# Patient Record
Sex: FEMALE | Race: BLACK OR AFRICAN AMERICAN | NOT HISPANIC OR LATINO | URBAN - METROPOLITAN AREA
[De-identification: names, ages, dates, MRNs, and addresses within clinical notes are randomized per-mention and may not be internally consistent; named-entity substitution may affect disease eponyms.]

---

## 2019-09-11 ENCOUNTER — EMERGENCY (EMERGENCY)
Facility: HOSPITAL | Age: 29
LOS: 1 days | Discharge: HOME | End: 2019-09-11
Attending: EMERGENCY MEDICINE | Admitting: EMERGENCY MEDICINE
Payer: MEDICARE

## 2019-09-11 VITALS
DIASTOLIC BLOOD PRESSURE: 75 MMHG | OXYGEN SATURATION: 100 % | HEART RATE: 90 BPM | TEMPERATURE: 98 F | SYSTOLIC BLOOD PRESSURE: 116 MMHG | RESPIRATION RATE: 18 BRPM

## 2019-09-11 PROCEDURE — 99283 EMERGENCY DEPT VISIT LOW MDM: CPT

## 2019-09-11 RX ORDER — BENZOCAINE AND MENTHOL 5; 1 G/100ML; G/100ML
1 LIQUID ORAL ONCE
Refills: 0 | Status: COMPLETED | OUTPATIENT
Start: 2019-09-11 | End: 2019-09-11

## 2019-09-11 RX ORDER — DEXAMETHASONE 0.5 MG/5ML
8 ELIXIR ORAL ONCE
Refills: 0 | Status: COMPLETED | OUTPATIENT
Start: 2019-09-11 | End: 2019-09-11

## 2019-09-11 RX ORDER — DEXAMETHASONE 0.5 MG/5ML
4 ELIXIR ORAL ONCE
Refills: 0 | Status: DISCONTINUED | OUTPATIENT
Start: 2019-09-11 | End: 2019-09-11

## 2019-09-11 RX ORDER — ACETAMINOPHEN 500 MG
650 TABLET ORAL ONCE
Refills: 0 | Status: COMPLETED | OUTPATIENT
Start: 2019-09-11 | End: 2019-09-11

## 2019-09-11 RX ADMIN — Medication 650 MILLIGRAM(S): at 13:47

## 2019-09-11 RX ADMIN — BENZOCAINE AND MENTHOL 1 LOZENGE: 5; 1 LIQUID ORAL at 13:47

## 2019-09-11 RX ADMIN — Medication 8 MILLIGRAM(S): at 13:47

## 2019-09-11 NOTE — ED PROVIDER NOTE - ATTENDING CONTRIBUTION TO CARE
agree with resident note  "39 y/o F w/ no PMH from [facility] group home p/w x1 day of sore throat and HA. Patient denies rhinorrhea, cough, SOB, stridor, chest pain or food impaction. Per aide, patient has no hx of foreign body ingestion. No sick contacts in group home. No urinary symptoms or abd pain."    PE: well appearing; afebrile; no stridor; no tonsillar exudates; slightly erythematous posterior pharynx; no uvular deviation; CTAB/L; s1 s2 no m/r/g abd soft/NT/ND    Imp: viral pharyngitis; steroids, NSAIDs, lozenges, reassess

## 2019-09-11 NOTE — ED ADULT TRIAGE NOTE - CHIEF COMPLAINT QUOTE
Pt from [facility] adult home arrives with co sore throat and headache today. pt is afebrile

## 2019-09-11 NOTE — ED PROVIDER NOTE - NSFOLLOWUPINSTRUCTIONS_ED_ALL_ED_FT
You were seen in the Emergency Department for XYZ.   1) Advance activity as tolerated.    2) Continue all previously prescribed medications as directed.    3) Follow up with your primary care physician in 24-48 hours - take copies of your results.    4) Return to the Emergency Department for worsening or persistent symptoms, and/or ANY NEW OR CONCERNING SYMPTOMS including but not limited to worsening shortness of breath, difficulty speaking in full sentences, severe chest pain or abdominal pain.

## 2019-09-11 NOTE — ED PROVIDER NOTE - PATIENT PORTAL LINK FT
You can access the FollowMyHealth Patient Portal offered by [facility] by registering at the following website: [email or web address]. By joining [employer]’s FollowMyHealth portal, you will also be able to view your health information using other applications (apps) compatible with our system.

## 2019-09-11 NOTE — ED PROVIDER NOTE - CLINICAL SUMMARY MEDICAL DECISION MAKING FREE TEXT BOX
39 y/o F w/ likely viral pharyngitis. Clinically well-appearing, non-toxic. Dexamethasone IM, cepacol and tylenol given, reassess. Likely discharge.

## 2019-09-11 NOTE — ED PROVIDER NOTE - PHYSICAL EXAMINATION
[Const] well-appearing, resting comfortably, no acute distress  [HEENT] PERRL, EOM, MMM, no tonsillar exudate, cobblestoning or erythema  [Neck] Supple, trachea midline  [CV] +S1/S2, no m/r/g appreciated  [Lungs] CTABL, no adventitious lung sounds  [Abd] soft, non-tender, nondistended in all 4 quadrants  [MSK] 5/5 UE and LR str BL  [Skin] warm, dry, well-perfused  [Neuro] A&Ox3, CN II-XII intact

## 2019-09-11 NOTE — ED PROVIDER NOTE - OBJECTIVE STATEMENT
37 y/o F w/ no PMH from [facility] group home p/w x1 day of sore throat and HA. Patient denies rhinorrhea, cough, SOB, stridor, chest pain or food impaction. Per aide, patient has no hx of foreign body ingestion. No sick contacts in group home. No urinary symptoms or abd pain.

## 2019-09-16 PROBLEM — Z78.9 OTHER SPECIFIED HEALTH STATUS: Chronic | Status: ACTIVE | Noted: 2019-09-11

## 2019-09-26 ENCOUNTER — NON-APPOINTMENT (OUTPATIENT)
Age: 29
End: 2019-09-26

## 2019-09-26 ENCOUNTER — APPOINTMENT (OUTPATIENT)
Dept: OPHTHALMOLOGY | Facility: CLINIC | Age: 29
End: 2019-09-26
Payer: MEDICARE

## 2019-09-26 PROCEDURE — 92004 COMPRE OPH EXAM NEW PT 1/>: CPT

## 2022-04-25 ENCOUNTER — NON-APPOINTMENT (OUTPATIENT)
Age: 32
End: 2022-04-25

## 2022-04-25 ENCOUNTER — APPOINTMENT (OUTPATIENT)
Dept: OPHTHALMOLOGY | Facility: CLINIC | Age: 32
End: 2022-04-25
Payer: MEDICARE

## 2022-04-25 PROCEDURE — 92014 COMPRE OPH EXAM EST PT 1/>: CPT

## 2024-12-17 ENCOUNTER — EMERGENCY (EMERGENCY)
Facility: HOSPITAL | Age: 34
LOS: 1 days | Discharge: HOME | End: 2024-12-17
Attending: EMERGENCY MEDICINE | Admitting: EMERGENCY MEDICINE
Payer: MEDICARE

## 2024-12-17 VITALS
SYSTOLIC BLOOD PRESSURE: 135 MMHG | RESPIRATION RATE: 18 BRPM | TEMPERATURE: 98 F | OXYGEN SATURATION: 99 % | WEIGHT: 259.93 LBS | DIASTOLIC BLOOD PRESSURE: 90 MMHG | HEART RATE: 73 BPM

## 2024-12-17 DIAGNOSIS — T78.3XXA ANGIONEUROTIC EDEMA, INITIAL ENCOUNTER: ICD-10-CM

## 2024-12-17 LAB
ALBUMIN SERPL ELPH-MCNC: 3.9 G/DL — SIGNIFICANT CHANGE UP (ref 3.3–5)
ALP SERPL-CCNC: 56 U/L — SIGNIFICANT CHANGE UP (ref 40–120)
ALT FLD-CCNC: 17 U/L — SIGNIFICANT CHANGE UP (ref 4–33)
ANION GAP SERPL CALC-SCNC: 14 MMOL/L — SIGNIFICANT CHANGE UP (ref 7–14)
AST SERPL-CCNC: 19 U/L — SIGNIFICANT CHANGE UP (ref 4–32)
BASOPHILS # BLD AUTO: 0.02 K/UL — SIGNIFICANT CHANGE UP (ref 0–0.2)
BASOPHILS NFR BLD AUTO: 0.4 % — SIGNIFICANT CHANGE UP (ref 0–2)
BILIRUB SERPL-MCNC: 0.3 MG/DL — SIGNIFICANT CHANGE UP (ref 0.2–1.2)
BUN SERPL-MCNC: 8 MG/DL — SIGNIFICANT CHANGE UP (ref 7–23)
CALCIUM SERPL-MCNC: 9.1 MG/DL — SIGNIFICANT CHANGE UP (ref 8.4–10.5)
CHLORIDE SERPL-SCNC: 104 MMOL/L — SIGNIFICANT CHANGE UP (ref 98–107)
CO2 SERPL-SCNC: 20 MMOL/L — LOW (ref 22–31)
CREAT SERPL-MCNC: 0.55 MG/DL — SIGNIFICANT CHANGE UP (ref 0.5–1.3)
CRP SERPL-MCNC: 8.8 MG/L — HIGH
EGFR: 125 ML/MIN/1.73M2 — SIGNIFICANT CHANGE UP
EOSINOPHIL # BLD AUTO: 0.07 K/UL — SIGNIFICANT CHANGE UP (ref 0–0.5)
EOSINOPHIL NFR BLD AUTO: 1.5 % — SIGNIFICANT CHANGE UP (ref 0–6)
ERYTHROCYTE [SEDIMENTATION RATE] IN BLOOD: 18 MM/HR — SIGNIFICANT CHANGE UP (ref 4–25)
FLUAV AG NPH QL: SIGNIFICANT CHANGE UP
FLUBV AG NPH QL: SIGNIFICANT CHANGE UP
GAS PNL BLDV: SIGNIFICANT CHANGE UP
GLUCOSE SERPL-MCNC: 84 MG/DL — SIGNIFICANT CHANGE UP (ref 70–99)
HCT VFR BLD CALC: 38.1 % — SIGNIFICANT CHANGE UP (ref 34.5–45)
HGB BLD-MCNC: 13.5 G/DL — SIGNIFICANT CHANGE UP (ref 11.5–15.5)
IANC: 2.79 K/UL — SIGNIFICANT CHANGE UP (ref 1.8–7.4)
IMM GRANULOCYTES NFR BLD AUTO: 0.2 % — SIGNIFICANT CHANGE UP (ref 0–0.9)
LYMPHOCYTES # BLD AUTO: 1.43 K/UL — SIGNIFICANT CHANGE UP (ref 1–3.3)
LYMPHOCYTES # BLD AUTO: 29.8 % — SIGNIFICANT CHANGE UP (ref 13–44)
MAGNESIUM SERPL-MCNC: 2 MG/DL — SIGNIFICANT CHANGE UP (ref 1.6–2.6)
MCHC RBC-ENTMCNC: 29.4 PG — SIGNIFICANT CHANGE UP (ref 27–34)
MCHC RBC-ENTMCNC: 35.4 G/DL — SIGNIFICANT CHANGE UP (ref 32–36)
MCV RBC AUTO: 83 FL — SIGNIFICANT CHANGE UP (ref 80–100)
MONOCYTES # BLD AUTO: 0.48 K/UL — SIGNIFICANT CHANGE UP (ref 0–0.9)
MONOCYTES NFR BLD AUTO: 10 % — SIGNIFICANT CHANGE UP (ref 2–14)
NEUTROPHILS # BLD AUTO: 2.79 K/UL — SIGNIFICANT CHANGE UP (ref 1.8–7.4)
NEUTROPHILS NFR BLD AUTO: 58.1 % — SIGNIFICANT CHANGE UP (ref 43–77)
NRBC # BLD: 0 /100 WBCS — SIGNIFICANT CHANGE UP (ref 0–0)
NRBC # FLD: 0 K/UL — SIGNIFICANT CHANGE UP (ref 0–0)
PHOSPHATE SERPL-MCNC: 3 MG/DL — SIGNIFICANT CHANGE UP (ref 2.5–4.5)
PLATELET # BLD AUTO: 238 K/UL — SIGNIFICANT CHANGE UP (ref 150–400)
POTASSIUM SERPL-MCNC: 4.2 MMOL/L — SIGNIFICANT CHANGE UP (ref 3.5–5.3)
POTASSIUM SERPL-SCNC: 4.2 MMOL/L — SIGNIFICANT CHANGE UP (ref 3.5–5.3)
PROT SERPL-MCNC: 7.4 G/DL — SIGNIFICANT CHANGE UP (ref 6–8.3)
RBC # BLD: 4.59 M/UL — SIGNIFICANT CHANGE UP (ref 3.8–5.2)
RBC # FLD: 12.5 % — SIGNIFICANT CHANGE UP (ref 10.3–14.5)
RSV RNA NPH QL NAA+NON-PROBE: SIGNIFICANT CHANGE UP
SARS-COV-2 RNA SPEC QL NAA+PROBE: SIGNIFICANT CHANGE UP
SODIUM SERPL-SCNC: 138 MMOL/L — SIGNIFICANT CHANGE UP (ref 135–145)
WBC # BLD: 4.8 K/UL — SIGNIFICANT CHANGE UP (ref 3.8–10.5)
WBC # FLD AUTO: 4.8 K/UL — SIGNIFICANT CHANGE UP (ref 3.8–10.5)

## 2024-12-17 PROCEDURE — 99223 1ST HOSP IP/OBS HIGH 75: CPT | Mod: FS

## 2024-12-17 PROCEDURE — 70491 CT SOFT TISSUE NECK W/DYE: CPT | Mod: 26,MC

## 2024-12-17 RX ORDER — METHYLPREDNISOLONE SOD SUCC 125 MG
125 VIAL (EA) INJECTION ONCE
Refills: 0 | Status: COMPLETED | OUTPATIENT
Start: 2024-12-17 | End: 2024-12-17

## 2024-12-17 RX ORDER — FAMOTIDINE 20 MG/1
20 TABLET, FILM COATED ORAL EVERY 12 HOURS
Refills: 0 | Status: ACTIVE | OUTPATIENT
Start: 2024-12-17 | End: 2025-11-15

## 2024-12-17 RX ORDER — METHYLPREDNISOLONE SOD SUCC 125 MG
40 VIAL (EA) INJECTION EVERY 8 HOURS
Refills: 0 | Status: DISCONTINUED | OUTPATIENT
Start: 2024-12-17 | End: 2024-12-17

## 2024-12-17 RX ORDER — VALACYCLOVIR HYDROCHLORIDE 1 G/1
1000 TABLET, FILM COATED ORAL ONCE
Refills: 0 | Status: COMPLETED | OUTPATIENT
Start: 2024-12-17 | End: 2024-12-17

## 2024-12-17 RX ORDER — METHYLPREDNISOLONE SOD SUCC 125 MG
40 VIAL (EA) INJECTION EVERY 8 HOURS
Refills: 0 | Status: ACTIVE | OUTPATIENT
Start: 2024-12-17 | End: 2025-11-15

## 2024-12-17 RX ORDER — DIPHENHYDRAMINE HCL 25 MG
25 CAPSULE ORAL EVERY 6 HOURS
Refills: 0 | Status: ACTIVE | OUTPATIENT
Start: 2024-12-17 | End: 2025-11-15

## 2024-12-17 RX ORDER — FAMOTIDINE 20 MG/1
20 TABLET, FILM COATED ORAL DAILY
Refills: 0 | Status: ACTIVE | OUTPATIENT
Start: 2024-12-17 | End: 2025-11-15

## 2024-12-17 RX ORDER — DIPHENHYDRAMINE HCL 25 MG
50 CAPSULE ORAL ONCE
Refills: 0 | Status: COMPLETED | OUTPATIENT
Start: 2024-12-17 | End: 2024-12-17

## 2024-12-17 RX ADMIN — Medication 25 MILLIGRAM(S): at 18:15

## 2024-12-17 RX ADMIN — VALACYCLOVIR HYDROCHLORIDE 1000 MILLIGRAM(S): 1 TABLET, FILM COATED ORAL at 13:53

## 2024-12-17 RX ADMIN — FAMOTIDINE 20 MILLIGRAM(S): 20 TABLET, FILM COATED ORAL at 22:24

## 2024-12-17 RX ADMIN — Medication 50 MILLIGRAM(S): at 10:07

## 2024-12-17 RX ADMIN — Medication 40 MILLIGRAM(S): at 18:14

## 2024-12-17 RX ADMIN — FAMOTIDINE 100 MILLIGRAM(S): 20 TABLET, FILM COATED ORAL at 10:07

## 2024-12-17 RX ADMIN — Medication 125 MILLIGRAM(S): at 10:06

## 2024-12-17 NOTE — ED ADULT NURSE NOTE - NSFALLUNIVINTERV_ED_ALL_ED
Bed/Stretcher in lowest position, wheels locked, appropriate side rails in place/Call bell, personal items and telephone in reach/Instruct patient to call for assistance before getting out of bed/chair/stretcher/Non-slip footwear applied when patient is off stretcher/[place] to call system/Physically safe environment - no spills, clutter or unnecessary equipment/Purposeful proactive rounding/Room/bathroom lighting operational, light cord in reach

## 2024-12-17 NOTE — ED CDU PROVIDER INITIAL DAY NOTE - PHYSICAL EXAMINATION
CONSTITUTIONAL: Well-appearing; well-nourished; in no apparent distress;  HEAD: Normocephalic, atraumatic;  EYES: PERRL, EOM intact, conjunctiva and sclera WNL;  ENT: normal nose; no rhinorrhea; + lower lip edema, + R small vesicular lesion just outside of vermillion border on R corner of mouth. tolerating secretions, no trismus.  NECK/LYMPH: Supple; non-tender;  CARD: Normal S1, S2; no murmurs, rubs, or gallops noted  RESP: Normal chest excursion with respiration; breath sounds clear and equal bilaterally; no wheezes, rhonchi, or rales noted  EXT/MS: moves all extremities  SKIN: Normal for age and race  NEURO: Awake, alert, oriented x 3, no gross deficits  PSYCH: Normal mood; appropriate affect

## 2024-12-17 NOTE — ED PROVIDER NOTE - OBJECTIVE STATEMENT
35 y/o F with PMHx of intellectual disability presenting today with lip and facial swelling since this morning. Pt woke up this morning with swelling to her lower lip and right side of her lower face. Pt's voice appears muffled however pt's aid states her voice normally sounds like this at baseline. Limited ability to open pt's mouth. Pt denies fever, chills, SOB, throat pain, recent dental procedures, ear pain, neck pain, rash, abdominal pain, nausea, vomiting, diarrhea, known allergens, recent trial of new foods or hygiene products. Pt takes lisinopril, dose recently changed about a month ago.

## 2024-12-17 NOTE — ED ADULT TRIAGE NOTE - CHIEF COMPLAINT QUOTE
pt brought in by EMS from group home for lip swelling. as per staff pt went to bed and woke up with lip swelling. pt denies SOB. pt denies any allergies.

## 2024-12-17 NOTE — CHART NOTE - NSCHARTNOTEFT_GEN_A_CORE
Orthopedics consulted for incidental finding of posterior longitudinal ligament ossification C2-C3 seen on CT Neck soft tissue. Team has no concerns for neuro deficits or complaints related to this finding. No ortho intervention indicated at this time. Patient may establish care with spine surgery if symptoms arise in the future.     For all questions related to patient care, please reach out via the on-call pager.*     Maria Del Carmen Pham PGY2  Orthopedic Surgery  [facility]: [id number]  [facility]: [id number]  [facility]: [id number]

## 2024-12-17 NOTE — ED CDU PROVIDER INITIAL DAY NOTE - OBJECTIVE STATEMENT
34yoF PMH intellectual disability from group home with aid at bedside Magaly providing history, HTN on lisinopril, last dose this AM and recently increaesd the dose with her PCP, p/w lower lip swelling and R sided facial swelling this AM. no difficulty breathing, changes in speech, difficulty swallowing, n/v/d/c, fevers. ENT was consulted and pt unable to tolerate scope, therefore CT neck was ordered revealing " Nasopharyngeal swelling, nonspecific, without focal lesion 2. Mildly enlarged level 1 lymph nodes, nonspecific 3. Heterogeneous parotid glands including small nodules ; consider ultrasound evaluation 4. Heterotopic ossification the posterior longitudinal ligament causes deformity of the ventral cervical cord at the C2-C3 level". Neurosurgery was consulted, no urgent recommendations at this time other than outpatient f/u. labs wnl. pt given steroids, benadryl, and will continue iv steroids/benadryl in CDU and ENT reassessment.

## 2024-12-17 NOTE — ED PROVIDER NOTE - PHYSICAL EXAMINATION
GENERAL: no acute distress, non-toxic appearing  HEENT: normal conjunctiva, oral mucosa moist, soft floor of the mouth and tongue, unable to visualize posterior oropharynx 2/2 to pt cooperation. no palpable mass or abscess, no palpable lymphadenopathy, muffled voice, trismus. edema to the right side of the lower lip and right lower jaw  CARDIAC: regular rate and regular rhythm, normal S1 and S2, no appreciable murmurs  PULM: clear to ascultation bilaterally, no appreciable crackles, rales, rhonchi, or wheezing, no increased work of breathing  GI: abdomen nondistended, soft, nontender  SKIN: no visible rashes

## 2024-12-17 NOTE — ED CDU PROVIDER INITIAL DAY NOTE - ATTENDING APP SHARED VISIT CONTRIBUTION OF CARE
Dr. Main: 33 yo female with intellectual disability, living in a group facility, and HTN on lisinopril (last dose this morning), in ED with swelling to lower lip and right face noted this morning.  Pt went to sleep feeling well, awoke noticing these changes.  AT BASELINE pt has a garbled/slurred voice as per supriya Mckenzie at bedside, and pt's speech is AT BASELINE in ED.  Pt states that she does not feel like she is having difficulty opening mouth, but appears to possibly have trismus.  No difficult swallowing or breathing.  No CP/SOB, N/V/D or abdominal pain.  No fever.  No recent new meds, soaps, detergents, foods.  Pt has been on lisinopril for years but somewhat recently had increase in dose.  On exam pt overall well appearing, in NAD, handling secretions easily.  Face shows mild generalized swelling but R>L, and there are ?vesicles at right corner of lips, but not open.  Oral cavity difficult to examine due to pt not opening fully; based on what is visualized, teeth intact, uvula midline, tongue midline without elevation and there is no sublingual swelling.  Unable to visualize tonsils.  Speech normal (at baseline).  ENT unable to scope pt due to difficulty with pt tolerating, so placed in CDU for continued treatment for possible allergic reaction vs. angioedema, re-eval, and ortho/spine consult for incidental finding at C2-C3 on CT.  Plan to discharge with Valtrex if cleared following observation period.

## 2024-12-17 NOTE — ED ADULT NURSE NOTE - OBJECTIVE STATEMENT
Patient brought to intake room 12, ambulatory, A&Ox4, speech gargled at baseline, but can be encouraged to speak, english speaking accompanied by aide, coming to the ed for complaints of possible allergic reaction, patient appears with swollen lip, no trauma cause, no po food intake since last known norm of 9pm last night, only recent change is increase in lisinopril dose, airway is patent no redness or irritation in mouth, respirations are even, and unlabored, patient denies any chest tightness, throat swelling, difficulty breathing, headache, dizziness, blurry vision, abdominal pain, no gu/gi complaints, No N/V/D, skin warm, dry, intact, IV 20g placed to L hand, care plan continued, comfort measures provided, safety maintained, awaiting medication order, and imaging,

## 2024-12-17 NOTE — ED PROVIDER NOTE - ATTENDING CONTRIBUTION TO CARE
Dr. Main: 35 yo female with intellectual disability, living in a group facility, and HTN, in ED with swelling to lower lip and right face noted this morning.  Pt went to sleep feeling well, awoke noticing these changes.  AT BASELINE pt has a garbled/slurred voice as per supriya Mckenzie at bedside, and pt's speech is AT BASELINE in ED.  Pt states that she does not feel like she is having difficulty opening mouth, but appears to possibly have trismus.  No difficult swallowing or breathing.  No CP/SOB, N/V/D or abdominal pain.  No fever.  No recent new meds, soaps, detergents, foods.  Pt has been on lisinopril for years but somewhat recently had increase in dose.  On exam pt overall well appearing, in NAD, handling secretions easily.  Face shows mild generalized swelling but R>L, and there are ?vesicles at right corner of lips, but not open.  Oral cavity difficult to examine due to pt not opening fully; based on what is visualized, teeth intact, uvula midline, tongue midline without elevation and there is no sublingual swelling.  Unable to visualize tonsils.  Speech normal (at baseline). Dr. Main: 33 yo female with intellectual disability, living in a group facility, and HTN on lisinopril (last dose this morning), in ED with swelling to lower lip and right face noted this morning.  Pt went to sleep feeling well, awoke noticing these changes.  AT BASELINE pt has a garbled/slurred voice as per supriya Mckenzie at bedside, and pt's speech is AT BASELINE in ED.  Pt states that she does not feel like she is having difficulty opening mouth, but appears to possibly have trismus.  No difficult swallowing or breathing.  No CP/SOB, N/V/D or abdominal pain.  No fever.  No recent new meds, soaps, detergents, foods.  Pt has been on lisinopril for years but somewhat recently had increase in dose.  On exam pt overall well appearing, in NAD, handling secretions easily.  Face shows mild generalized swelling but R>L, and there are ?vesicles at right corner of lips, but not open.  Oral cavity difficult to examine due to pt not opening fully; based on what is visualized, teeth intact, uvula midline, tongue midline without elevation and there is no sublingual swelling.  Unable to visualize tonsils.  Speech normal (at baseline).

## 2024-12-17 NOTE — CONSULT NOTE ADULT - PROBLEM SELECTOR RECOMMENDATION 9
1. decadron 10 mg x 1   2. pepcid x 1 iv  3. observe in CDU until this kevon, will discuss w Dr. Llanos and update dc plan 1. decadron 10 mg x 1   2. pepcid x 1 iv  3. observe in CDU until this kevon, if improving can discharge on a medrol dosepak   4. Follow up out patient ENT clinic for CT findings of parotid nodule, appt can be scheduled at   discussed plan with dr. Llanos

## 2024-12-17 NOTE — ED CDU PROVIDER INITIAL DAY NOTE - PROGRESS NOTE DETAILS
JESSIKA Gibbs: Pt received at sign out pending reassessment and monitoring, pt without complaints or SOB. Pt still with lower lip swelling, no stridor, no wheezes, lungs CTAB. (delayed entry) pt reassessed, with persistent lip swelling. some improvement of her R side. tolerating secretions and PO. spoke w/ ENT, recommending keep monitoring until significant improvement. will reassess in AM.

## 2024-12-17 NOTE — CONSULT NOTE ADULT - SUBJECTIVE AND OBJECTIVE BOX
CC: lip swelling     HPI: 34 year old female with HTN that presents to the ED with PCA stating she recently increased her dose of lisinopril. Patient was found to have R lip edema this morning that is now entire lower lip. Denies any SOB, hoarseness, dyspnea, cough or other complaints. Found to have left parotid nodule on CT, denies any facial pain, swelling or erythema.      PAST MEDICAL & SURGICAL HISTORY:  No pertinent past medical history      No significant past surgical history        Allergies    No Known Allergies    Intolerances      MEDICATIONS  (STANDING):  famotidine  IVPB 20 milliGRAM(s) IV Intermittent daily    MEDICATIONS  (PRN):      ROS:   ENT: all negative except as noted in HPI   CV: denies palpitations  Pulm: denies SOB, cough, hemoptysis  GI: denies change in apetite, indigestion, n/v  : denies pertinent urinary symptoms, urgency  Neuro: denies numbness/tingling, loss of sensation  Psych: denies anxiety  MS: denies muscle weakness, instability  Heme: denies easy bruising or bleeding  Endo: denies heat/cold intolerance, excessive sweating  Vascular: denies LE edema    Vital Signs Last 24 Hrs  T(C): 36.9 (17 Dec 2024 08:44), Max: 36.9 (17 Dec 2024 08:44)  T(F): 98.5 (17 Dec 2024 08:44), Max: 98.5 (17 Dec 2024 08:44)  HR: 73 (17 Dec 2024 08:44) (73 - 73)  BP: 135/90 (17 Dec 2024 08:44) (135/90 - 135/90)  BP(mean): --  RR: 18 (17 Dec 2024 08:44) (18 - 18)  SpO2: 99% (17 Dec 2024 08:44) (99% - 99%)    Parameters below as of 17 Dec 2024 08:44  Patient On (Oxygen Delivery Method): room air                              13.5   4.80  )-----------( 238      ( 17 Dec 2024 09:35 )             38.1    12-17    138  |  104  |  8   ----------------------------<  84  4.2   |  20[L]  |  0.55    Ca    9.1      17 Dec 2024 09:35  Phos  3.0     12-17  Mg     2.00     12-17    TPro  7.4  /  Alb  3.9  /  TBili  0.3  /  DBili  x   /  AST  19  /  ALT  17  /  AlkPhos  56  12-17       PHYSICAL EXAM:  Gen: NAD  Skin: No rashes, bruises, or lesions  Head: Normocephalic, Atraumatic  Face: no edema, erythema, or fluctuance. Parotid glands soft without mass  Eyes: no scleral injection  Ears: Right - ear canal clear, TM intact without effusion or erythema. No evidence of any fluid drainage. No mastoid tenderness, erythema, or ear bulging            Left - ear canal clear, TM intact without effusion or erythema. No evidence of any fluid drainage. No mastoid tenderness, erythema, or ear bulging  Nose: Nares bilaterally patent, no discharge  Mouth: No Stridor / Drooling / Trismus.  Mucosa moist, tongue/uvula midline, oropharynx clear  Neck: Flat, supple, no lymphadenopathy, trachea midline, no masses  Lymphatic: No lymphadenopathy  Resp: breathing easily, no stridor  CV: no peripheral edema/cyanosis  GI: nondistended   Peripheral vascular: no JVD or edema  Neuro: facial nerve intact, no facial droop      Diagnostic Nasal Endoscopy: patient refused scope exam and pulled scope out    Fiberoptic Indirect laryngoscopy: rufused scope exam     IMAGING/ADDITIONAL STUDIES:   IMPRESSION:    1. Nasopharyngeal swelling, nonspecific, without focal lesion    2. Mildly enlarged level 1 lymph nodes, nonspecific    3. Heterogeneous parotid glands including small nodules ; consider ultrasound evaluation    4. Heterotopic ossification the posterior longitudinal ligament causes deformity of the ventral cervical cord at the C2-C3 level    5. No significant abnormality of the temporal mandibular joints identified by the CT technique    6. Mild asymmetric soft tissue prominence along the buccal surface of the right hemimandible, without well-defined abscess

## 2024-12-17 NOTE — ED PROVIDER NOTE - PROGRESS NOTE DETAILS
Dr. Main: lower lip slightly more swollen than before, but still no airway issues and no difficult breathing/speaking/swallowing; ENT evaluated pt but pt not able to tolerate scope; ENT recommending prolonged observation to see if there is worsening Dr. Main: plan is for CDU for continued observation, start Valtrex for possible vesicular lesions near lip, follow up NSX eval for incidental findings on CT, and follow up ENT; discontinue lisinopril unless other more obvious cause of presentation found

## 2024-12-17 NOTE — ED CDU PROVIDER INITIAL DAY NOTE - CLINICAL SUMMARY MEDICAL DECISION MAKING FREE TEXT BOX
34yoF PMH intellectual disability from group home with aid at bedside Magaly providing history, HTN on lisinopril, last dose this AM and recently increaesd the dose with her PCP, p/w lower lip swelling and R sided facial swelling this AM.   suspect angioedema  ENT was consulted and pt unable to tolerate scope, therefore CT neck was ordered revealing " Nasopharyngeal swelling, nonspecific, without focal lesion 2. Mildly enlarged level 1 lymph nodes, nonspecific 3. Heterogeneous parotid glands including small nodules ; consider ultrasound evaluation 4. Heterotopic ossification the posterior longitudinal ligament causes deformity of the ventral cervical cord at the C2-C3 level". Neurosurgery was consulted, no urgent recommendations at this time other than outpatient f/u. labs wnl.     pt given steroids, benadryl, and will continue iv steroids/benadryl in CDU and ENT reassessment.

## 2024-12-17 NOTE — ED PROVIDER NOTE - CLINICAL SUMMARY MEDICAL DECISION MAKING FREE TEXT BOX
33 y/o F with PMHx of intellectual disability presenting today with lip and facial swelling since this morning. Pt woke up this morning with swelling to her lower lip and right side of her lower face. No known allergens, recent trial of new foods or hygiene products. Pt takes lisinopril, dose recently changed about a month ago. Trismus and edema to the right side of the lower lip and right lower jaw on exam. Unable to visualize posterior orophaynx however does not appear in acute respiratory distress at this time. Low concern for anaphylaxis at this time given lack of two body system involvement. Concern for allergic reaction 2/2 lisinopril vs infection. Will obtain CT images, basic labs and give steroids, benadryl and famotidine. ENT attempted to scope the pt but unsuccessful, recommending CDU admission for observation. Ortho consulted for incidental cervical findings on CT. Non actionable labs without signs of infection. HDS. Pt admitted to the CDU.

## 2024-12-18 VITALS
DIASTOLIC BLOOD PRESSURE: 83 MMHG | RESPIRATION RATE: 16 BRPM | HEART RATE: 87 BPM | TEMPERATURE: 98 F | SYSTOLIC BLOOD PRESSURE: 120 MMHG | OXYGEN SATURATION: 96 %

## 2024-12-18 PROCEDURE — 99239 HOSP IP/OBS DSCHRG MGMT >30: CPT

## 2024-12-18 RX ORDER — DIPHENHYDRAMINE HCL 25 MG
1 CAPSULE ORAL
Qty: 3 | Refills: 0
Start: 2024-12-18 | End: 2024-12-18

## 2024-12-18 RX ORDER — EPINEPHRINE 1 MG/ML(1)
0.3 AMPUL (ML) INJECTION
Qty: 1 | Refills: 0
Start: 2024-12-18 | End: 2024-12-18

## 2024-12-18 RX ORDER — LOSARTAN POTASSIUM 100 MG/1
1 TABLET, FILM COATED ORAL
Qty: 30 | Refills: 0
Start: 2024-12-18 | End: 2025-01-16

## 2024-12-18 RX ORDER — PREDNISONE 20 MG/1
1 TABLET ORAL
Qty: 4 | Refills: 0
Start: 2024-12-18 | End: 2024-12-21

## 2024-12-18 RX ORDER — VALACYCLOVIR HYDROCHLORIDE 1 G/1
1 TABLET, FILM COATED ORAL
Qty: 14 | Refills: 0
Start: 2024-12-18 | End: 2024-12-24

## 2024-12-18 RX ORDER — FAMOTIDINE 20 MG/1
1 TABLET, FILM COATED ORAL
Qty: 8 | Refills: 0
Start: 2024-12-18 | End: 2024-12-21

## 2024-12-18 RX ADMIN — FAMOTIDINE 20 MILLIGRAM(S): 20 TABLET, FILM COATED ORAL at 10:21

## 2024-12-18 RX ADMIN — Medication 25 MILLIGRAM(S): at 06:37

## 2024-12-18 RX ADMIN — Medication 40 MILLIGRAM(S): at 10:21

## 2024-12-18 RX ADMIN — Medication 25 MILLIGRAM(S): at 00:11

## 2024-12-18 RX ADMIN — Medication 40 MILLIGRAM(S): at 02:09

## 2024-12-18 NOTE — ED CDU PROVIDER DISPOSITION NOTE - NSFOLLOWUPINSTRUCTIONS_ED_ALL_ED_FT
Follow up with primary care provider Dr. Rapp within 24-48 hours. Take copy of results with you.  STOP Lisinopril, START Losartan 50mg daily.       Follow up with ENT for parotid gland findings on CT.    Follow up with Orthopedics for cervical changes on CT.     Take Prednisone 50mg daily for 4 days, Pepcid 20mg 2x/day for 4 days, Benadryl 25mg every 8 hours for 4 days- caution drowsiness/do not drive. Use Epi-pen only in case of emergency.       Worsening or new shortness of breath, tightness in your throat, swelling, chest pain, fever, chills, or ANY new concerning symptoms return to the Emergency Department.

## 2024-12-18 NOTE — ED CDU PROVIDER SUBSEQUENT DAY NOTE - ATTENDING APP SHARED VISIT CONTRIBUTION OF CARE
Patient is a 34-year-old female with a history of intellectual disability, hypertension, placed in CDU for monitoring of lower lip swelling and angioedema.  Patient presented with lower lip swelling and right-sided facial swelling after taking an increased dose of her lisinopril.  In the emergency department, ENT was consulted.  Patient did not tolerate a scope.  She did have a CT neck that showed: Nasopharyngeal swelling, nonspecific, without focal lesion 2. Mildly enlarged level 1 lymph nodes, nonspecific 3. Heterogeneous parotid glands including small nodules ; consider ultrasound evaluation 4. Heterotopic ossification the posterior longitudinal ligament causes deformity of the ventral cervical cord at the C2-C3 level". Neurosurgery was consulted, no urgent recommendations at this time other than outpatient f/u. labs wnl.   In the emergency department, patient was given steroids, benadryl.   Patient was placed in CDU for continued steroids/benadryl in CDU and ENT reassessment.  patient signed out this morning.  Accordingly, lip swelling has much improved.  Patient is tolerating p.o.  Patient was seen and examined this morning.      VS noted  Gen. no acute distress, Non toxic   HEENT: EOMI, mmm, lower lip swelling, airway intact, no stridor  Lungs: CTAB/L no C/ W /R   CVS: RRR   Abd; Soft non tender, non distended   Ext: no edema  Skin: no rash  Neuro:   Awake, alert, non focal, clear speech  a/p:  angioedema–lower lip swelling.  Per overnight PA assessment, lip swelling has improved.  On my exam there is still lip swelling present.  Patient was able to eat breakfast without any problems.  She denies any trouble breathing.  She is tolerating her secretions.  Plan for ENT reassessment and disposition.  - [place] DA SILVA

## 2024-12-18 NOTE — ED POST DISCHARGE NOTE - REASON FOR FOLLOW-UP
Other Home called about patient's rx 's not sent over. Pepcid and benadryl sent over. Also RX for Losartan sent by JESSIKA Barrientos

## 2024-12-18 NOTE — ED CDU PROVIDER SUBSEQUENT DAY NOTE - HISTORY
34yoF PMH intellectual disability from group home with aid at bedside Magaly providing history, HTN on lisinopril, last dose this AM and recently increased the dose with her PCP, p/w lower lip swelling and R sided facial swelling this AM.   suspect angioedema  ENT was consulted and pt unable to tolerate scope, therefore CT neck was ordered revealing " Nasopharyngeal swelling, nonspecific, without focal lesion 2. Mildly enlarged level 1 lymph nodes, nonspecific 3. Heterogeneous parotid glands including small nodules ; consider ultrasound evaluation 4. Heterotopic ossification the posterior longitudinal ligament causes deformity of the ventral cervical cord at the C2-C3 level". Neurosurgery was consulted, no urgent recommendations at this time other than outpatient f/u. labs wnl.   pt given steroids, benadryl, and will continue iv steroids/benadryl in CDU and ENT reassessment.  Interval hx- pt has been tolerating secretions, no worsening swelling or new symptoms. Will continue to monitor. VSS.

## 2024-12-18 NOTE — ED CDU PROVIDER DISPOSITION NOTE - NSPTACCESSSVCSAPPT_ED_ALL_ED
Post Acute Skilled Nursing Home Subsequent Visit Note     Date of Service: 10/21/2020  Location seen at: [facility]  Subacute / Skilled Need: Rehabilitation    PCP: Madyson Pena MD   Patient Care Team:  Madyson Pena MD as PCP - General (Internal Medicine)  Madyson Pena MD as Referring Provider (Internal Medicine)  Willem Pereira MD (Nephrology)  Wilfred Milton MD as Cardiovascular Disease (Internal Medicine- Interventional Cardiology)  CAROL Vergara as Nurse Practitioner- Psych/Mental Health (Nurse Practitioner - Psych/Mental Health)  Neda Jung MD as Pulmonary Medicine (Internal Medicine - Pulmonary Disease)  Bear GARCIA MD as Post Acute Facility Provider: Physician (Internal Medicine)  CAROL Nicolas as Post Acute Facility Provider: APC (Nurse Practitioner)  Seen by CAROL Nicolas today    Pallavi Benton is a 72 year old female presenting to Post Acute Skilled Nursing for: rehabilitation following hospitalization from 9/14/20- 10/9/20 for LE edema and generalized weakness.      History of Present Illness: The patient is found lying in bed. She  Reports pain to her hips, she descibes it as her skin burning. Her appetite is good. She feels therapy is going well.          HISTORY  Past Medical History:   Diagnosis Date   • A-V fistula (CMS/[facility])     left arm   • Acute renal failure (CMS/[facility]) 01/18/2019   • Anemia 01/18/2019   • Anxiety    • Arthritis     knees & hands   • Bipolar disorder (CMS/[facility])    • Bronchitis    • Chronic kidney disease, stage III (moderate) (CMS/[facility])    • Colon polyp 2007   • COPD (chronic obstructive pulmonary disease) (CMS/[facility])     4/30/2018: \"one  told me I have COPD\"   • Diabetes mellitus (CMS/[facility]) 2000    type 2 on insulin    • Diverticulosis    • Dry eye syndrome    • Fall 07/06/2018    Bruise above left eye, lip, and left Hand   • Hemodialysis patient (CMS/[facility]) started dialysis end of Jan 2019    Tue-Thur-Sat  at [facility] [address] [place] // Jan 2019 to 3/2019 - went to home Peritoneal dialysis 3/2019   • High cholesterol    • History of GI bleed 01/18/2019   • Hypertension    • Hypothyroid 1985   • Internal hemorrhoids    • Major depressive disorder    • SONYA (obstructive sleep apnea) 9/24/2020   • Peritoneal dialysis status (CMS/[facility]) start 3/2019   • Pneumonia 01/18/2019   • Pseudophakia of both eyes    • Seasonal allergies    • Sinusitis, chronic    • Snoring    • Tobacco use     quit 12/10/12 per pt   • Tubulovillous adenoma of rectum 01/01/2006    s/p low anterior resection   • Vitamin D deficiency       reports that she quit smoking about 2 years ago. Her smoking use included cigarettes. She has a 12.00 pack-year smoking history. She uses smokeless tobacco. She reports previous alcohol use of about 1.0 standard drinks of alcohol per week. She reports that she does not use drugs.  Past Surgical History:   Procedure Laterality Date   • Appendectomy     • Av fistula placement Left 05/30/2018     No BPs, lab draws, or IVs Left Arm   • Colon surgery  approx age early 60s    villous tumor of rectal area, surgical resection   • Colonoscopy diagnostic  07/01/2018    repeat July 2021   • Colonoscopy w/ polypectomy  05/26/2009    diverticulosis, colon polyps X 2   • Colonoscopy w/ polypectomy  06/21/2007    polyps X 2   • Colonoscopy w/ polypectomy  06/22/2006    large sessile polyp probably villous adenoma- too large to remove endoscopically. Benign polyps   • Coronary stent placement  06/24/2020    RCA stenosis with ulcerated plaque s/p 3.5 x 18 mm Resolute ISABEL   • Dialysis catheter insertion  04/12/2019    Laparoscopic   • Esophagogastroduodenoscopy  01/18/2019    Hemoclip applied to bleed   • Eye surgery Bilateral prior to 2006    lasik   • Joint replacement Left 2007-approx    left total knee replacement   • Joint replacement Right 2009-approx    right total knee replacement   • Pacemaker implant  08/17/2020    Micra  single-chamber leadless pacemaker for complete heart block   • Rotator cuff repair Left approx age 60s    left shoulder   • Tonsillectomy and adenoidectomy  childhood   • Hannibal tooth extraction  age 20s     Family History   Problem Relation Age of Onset   • Diabetes Mother    • Heart disease Mother    • Kidney disease Mother    • Cancer Father    • Heart disease Father    • Diabetes Father    • Psychiatric Sister         schizophrenic   • Heart disease Sister    • Psychiatric Sister      History     Last reviewed in this visit by CAROL Nicolas on 10/21/2020 at  9:46 PM    Sections Reviewed    Medical, Surgical, Family, Tobacco          PROBLEM LIST:  Patient Active Problem List   Diagnosis   • Abscess of anal and rectal regions   • Microalbuminuria   • Vitamin D deficiency   • Hypertension   • Hyperlipidemia   • DM type 2 (diabetes mellitus, type 2) (CMS/HCC)   • Bipolar disorder (CMS/HCC)   • Diabetes mellitus (CMS/HCC)   • Hypothyroid   • High cholesterol   • Post-operative pain   • Lung nodule   • Renal mass   • Anemia of chronic renal failure, stage 5 (CMS/HCC)   • Iron deficiency anemia   • Chronic kidney disease (CKD), stage V (CMS/HCC)   • Asymptomatic bilateral carotid artery stenosis   • Perirectal abscess   • GIB (gastrointestinal bleeding)   • PNA (pneumonia)   • Hyperparathyroidism, secondary (CMS/HCC)   • Iron deficiency anemia   • Acute gastritis with bleeding   • Anemia   • Arteriosclerosis of coronary artery   • Chronic obstructive pulmonary disease (CMS/HCC)   • Colon polyps   • History of hypothyroidism   • Chronic renal insufficiency   • ESRD on peritoneal dialysis (CMS/HCC)   • Anemia associated with acute blood loss   • Acquired hypothyroidism   • Other hyperlipidemia   • Coronary artery disease involving native coronary artery of native heart without angina pectoris   • Debility   • DM type 2 (diabetes mellitus, type 2) (CMS/HCC)   • Leucocytosis   • Pulmonary nodule   • Knee pain   •  Sinus pause   • Leadless Pacemaker ([employer]tronic) 8/16/2020   • Leg swelling   • Leg swelling   • SONYA (obstructive sleep apnea)   • Abnormal LFTs   • Rhabdomyolysis   • Spinal stenosis of lumbar region with neurogenic claudication       ADVANCE DIRECTIVES:  Power of  Status:  Not Activated  Code Status:  on file and Full Code  Goals of Care: rehabilitate and prolong survival return home    DEPRESSION SCREENING:  Recent PHQ 2/9 Score    PHQ 2:  Date Adult PHQ 2 Score Adult PHQ 2 Interpretation   9/15/2020 0 No further screening needed       PHQ 9:  Date Adult PHQ 9 Score Adult PHQ 9 Interpretation   2/17/2020 7 Mild Depression       DEPRESSION ASSESSMENT/PLAN:  Start and/or continue medication.  See orders for details.     ALLERGIES:  Allergies as of 10/21/2020 - Reviewed 10/21/2020   Allergen Reaction Noted   • Adhesive   (environmental) RASH 04/12/2019   • Doxycycline Other (See Comments) 02/24/2016       CURRENT MEDICATIONS:   Current Outpatient Medications   Medication Sig Dispense Refill   • HYDROcodone-acetaminophen (NORCO) 5-325 MG per tablet Take 1 tablet by mouth every 6 hours as needed for Pain. 60 tablet 0   • LORazepam (ATIVAN) 0.5 MG tablet Take 1 tablet by mouth every 8 hours as needed for Anxiety. 90 tablet 1   • zolpidem (AMBIEN) 5 MG tablet Take 1 tablet by mouth nightly as needed for Sleep. 30 tablet 0   • midodrine (PROAMATINE) 10 MG tablet Take 1 tablet by mouth 3 times daily (before meals). 90 tablet 0   • insulin glargine (Lantus SoloStar) 100 UNIT/ML pen-injector Inject 12 Units into the skin nightly.     • gabapentin (NEURONTIN) 100 MG capsule Take 1 capsule by mouth 3 times daily.     • sertraline (Zoloft) 100 MG tablet Take 1 tablet by mouth daily.     • levothyroxine 200 MCG tablet Take 1 tablet by mouth daily (before breakfast). Do not start before September 22, 2020. 90 tablet 0   • PERITONEAL DIALYSIS SOLUTIONS IP Inject into the peritoneum nightly. (1) 4.25% bag and (1) 2.5%  Specialty Care (immediate)... bag  Run over 6.5 hours     • sevelamer carbonate (RENVELA) 800 MG tablet Take 1,600 mg by mouth 3 times daily (with meals). Dose: 2 tablets (=1,600 mg)     • Lidocaine 3 % Cream Apply 1 application topically 2 times daily as needed (2 times BID PRN). 1 Tube 3   • clopidogrel (PLAVIX) 75 MG tablet Take 1 tablet by mouth daily. Do not start before August 19, 2020. 90 tablet 2   • diclofenac (VOLTAREN) 1 % gel Apply 2 g topically 4 times daily as needed for Pain. Apply to the right knee. 300 g 0   • ziprasidone (GEODON) 60 MG capsule Take 60 mg by mouth 2 times daily. Take in the morning and afternoon     • ziprasidone (GEODON) 80 MG capsule Take 1 capsule by mouth nightly. Indications: Manic-Depression 90 capsule 0   • pantoprazole (PROTONIX) 40 MG tablet Take 1 tablet by mouth 2 times daily. 180 tablet 1   • insulin NPH (NOVOLIN N) 100 UNIT/ML injectable suspension Indications: Type 2 Diabetes Inject 6 units with peritoneal dialysis; if skipping peritoneal dialysis hold NPH dose of insulin 10 mL 12   • insulin aspart (NOVOLOG FLEXPEN) 100 UNIT/ML pen-injector Indications: Type 2 Diabetes Inject 9 untis w/bkfst,lunch,dinner. Correction:<90 -2u,151-200+2u,201-250+4u,251-300+6u,>300+8u Max 51u 30 mL 1   • gentamicin (GARAMYCIN) 0.1 % cream Apply 1 application topically daily. Left side of stomach // Peritoneal dialysis catheter     • Omega-3 Fatty Acids (FISH OIL ADULT GUMMIES PO) Take 1-2 each by mouth 2 times daily.      • B complex-vitamin C-folic acid (NEPHRO-YUNIEL) 0.8 MG Tab Take 0.8 mg by mouth daily.       No current facility-administered medications for this visit.      Medications reviewed / reconciled: Yes    BASELINE FUNCTIONAL STATUS:  Independent, Walker and Wheelchair    CURRENT FUNCTIONAL STATUS:  2 wheeled walker and Wheelchair, dependant for all care, not ambulating or getting out of bed    DIET:  Consistency: General   Type: regular  Appetite: Normal    REVIEW OF SYSTEMS:  Review of Systems    Constitutional: Positive for fatigue.        \"I don't like it when the therapist pushes me to get up, I want to go at my own pace\"   HENT: Negative.    Eyes: Negative.    Respiratory: Negative.    Cardiovascular: Negative.    Gastrointestinal: Negative.    Endocrine: Negative.    Genitourinary: Negative.    Musculoskeletal: Positive for arthralgias (pain to hips).   Skin: Positive for wound (left heelleft hip and left heel).   Allergic/Immunologic: Negative.    Neurological: Negative.    Hematological: Negative.    Psychiatric/Behavioral: Negative.        VITALS:  Vitals:    10/21/20 0800   BP: 103/50   Pulse: 73   Resp: 17   Temp: 97.5 °F (36.4 °C)   SpO2: 97%   Weight: 84 kg       PHYSICAL ASSESSMENT:  Physical Exam  Vitals signs reviewed.   Constitutional:       General: She is not in acute distress.     Appearance: She is ill-appearing.   HENT:      Head: Normocephalic and atraumatic.      Nose: Nose normal.      Mouth/Throat:      Mouth: Mucous membranes are dry.   Cardiovascular:      Rate and Rhythm: Normal rate and regular rhythm.   Pulmonary:      Effort: Pulmonary effort is normal.      Breath sounds: Normal breath sounds.   Abdominal:      General: Bowel sounds are normal.      Palpations: Abdomen is soft.   Musculoskeletal:      Right lower leg: No edema.      Left lower leg: No edema.   Skin:     General: Skin is dry.      Coloration: Skin is pale.      Comments: Dressing in place to left hip, left heel deep  Tissue injury size improved   Neurological:      General: No focal deficit present.      Mental Status: She is alert.      Motor: Weakness present.      Gait: Gait abnormal.   Psychiatric:         Attention and Perception: Attention normal.         Mood and Affect: Mood normal.         LABS: no labs today, patient repeatedly refusing labs    ASSESSMENT AND PLAN  Generalized muscle weakness  (primary encounter diagnosis)  Plan: Continue PT (Physical Therapy) and OT (Occupational Therapy) for  strengthening and conditioning.      Peritoneal dialysis status (CMS/[facility])  Plan: patient tolerating well but according to staff is often non compliant with keeping her catheter unckinked and clear for peritoneal dialysis exchange, continue, monitor catheter, managed by Dr Pereira      Type 2 diabetes mellitus with ketoacidosis without coma, with long-term current use of insulin (CMS/[facility])  Plan: , adequately controlled, continue the current regimen    Chronic kidney disease (CKD), stage V (CMS/[facility])  Plan: peritoneal dialysis, continue, patient refusing labs    Pressure injury of deep tissue of left heel  Plan: being monitored by wound team, patient has her heels floated on a pillow, imrpoving with skin prep application    Open wound left hip  Plan: santyl to wound daily        DISCHARGE PLANNING: discharge set for 10/24/20, patient will most likely need to stay long term as she is unable to care for herself    Prognosis: fair    Discussed with: RN / Nursing, Family, SW, OT and PT    Barriers to discharge: therapy needs    Anticipated disposition: Disposition Not Yet Determined    Total time spent is more than 25 minutes with more than 50% of the time spent in coordination of care, counseling, review of records and discussion of plan of care with the patient /staff /family.      Bridgette Franco NP  10/21/20

## 2024-12-18 NOTE — ED CDU PROVIDER DISPOSITION NOTE - PATIENT PORTAL LINK FT
You can access the FollowMyHealth Patient Portal offered by [facility] by registering at the following website: [email or web address]. By joining [employer]’s FollowMyHealth portal, you will also be able to view your health information using other applications (apps) compatible with our system.

## 2024-12-18 NOTE — ED CDU PROVIDER DISPOSITION NOTE - ATTENDING CONTRIBUTION TO CARE
Patient is a 34-year-old female with a history of intellectual disability, hypertension, placed in CDU for monitoring of lower lip swelling and angioedema.  Patient presented with lower lip swelling and right-sided facial swelling after taking an increased dose of her lisinopril.  In the emergency department, ENT was consulted.  Patient did not tolerate a scope.  She did have a CT neck that showed: Nasopharyngeal swelling, nonspecific, without focal lesion 2. Mildly enlarged level 1 lymph nodes, nonspecific 3. Heterogeneous parotid glands including small nodules ; consider ultrasound evaluation 4. Heterotopic ossification the posterior longitudinal ligament causes deformity of the ventral cervical cord at the C2-C3 level". Neurosurgery was consulted, no urgent recommendations at this time other than outpatient f/u. labs wnl.   In the emergency department, patient was given steroids, benadryl.   Patient was placed in CDU for continued steroids/benadryl in CDU and ENT reassessment.  patient signed out this morning.  Accordingly, lip swelling has much improved.  Patient is tolerating p.o.  Patient was seen and examined this morning.      VS noted  Gen. no acute distress, Non toxic   HEENT: EOMI, mmm, lower lip swelling, airway intact, no stridor  Lungs: CTAB/L no C/ W /R   CVS: RRR   Abd; Soft non tender, non distended   Ext: no edema  Skin: no rash  Neuro:   Awake, alert, non focal, clear speech  a/p:  angioedema–lower lip swelling.  Per overnight PA assessment, lip swelling has improved.  On my exam there is still lip swelling present.  Patient was able to eat breakfast without any problems.  She denies any trouble breathing.  She is tolerating her secretions.  ENT cleared patient for discharged. Patient told to stop Lisinopril. PMD contacted for BP med recommendation and follow up. See clinical course.   - [place] DA SILVA.

## 2024-12-18 NOTE — ED CDU PROVIDER SUBSEQUENT DAY NOTE - PHYSICAL EXAMINATION
CONSTITUTIONAL: Well-appearing; well-nourished; in no apparent distress. Non-toxic appearing.   NEURO: Alert & oriented. Gait steady without assistance. Sensory and motor functions are grossly intact.  PSYCH: Mood appropriate. Thought processes intact.   ENT: (+) moderate lower lip edema. Airway patent no posterior pharynx edema.   NECK: Supple  CARD: Regular rate and rhythm, no murmurs  RESP: No accessory muscle use; breath sounds clear and equal bilaterally; no wheezes, rhonchi, or rales     ABD: Soft; non-distended; non-tender. No guarding or rebound.   MUSCULOSKELETAL/EXTREMITIES: FROM in all four extremities; no extremity edema.  SKIN: Warm; dry; no apparent lesions or exudate

## 2025-03-18 PROBLEM — I10 ESSENTIAL (PRIMARY) HYPERTENSION: Chronic | Status: ACTIVE | Noted: 2024-12-17

## 2025-03-18 PROBLEM — Z86.59 PERSONAL HISTORY OF OTHER MENTAL AND BEHAVIORAL DISORDERS: Chronic | Status: ACTIVE | Noted: 2024-12-17

## 2025-05-06 ENCOUNTER — EMERGENCY (EMERGENCY)
Facility: HOSPITAL | Age: 35
LOS: 1 days | End: 2025-05-06
Attending: EMERGENCY MEDICINE | Admitting: EMERGENCY MEDICINE
Payer: MEDICARE

## 2025-05-06 VITALS
HEART RATE: 77 BPM | WEIGHT: 199.96 LBS | OXYGEN SATURATION: 98 % | RESPIRATION RATE: 18 BRPM | DIASTOLIC BLOOD PRESSURE: 85 MMHG | TEMPERATURE: 98 F | HEIGHT: 65 IN | SYSTOLIC BLOOD PRESSURE: 128 MMHG

## 2025-05-06 VITALS
OXYGEN SATURATION: 99 % | TEMPERATURE: 98 F | DIASTOLIC BLOOD PRESSURE: 76 MMHG | HEART RATE: 81 BPM | RESPIRATION RATE: 18 BRPM | SYSTOLIC BLOOD PRESSURE: 127 MMHG

## 2025-05-06 LAB
ALBUMIN SERPL ELPH-MCNC: 3.8 G/DL — SIGNIFICANT CHANGE UP (ref 3.3–5)
ALP SERPL-CCNC: 47 U/L — SIGNIFICANT CHANGE UP (ref 40–120)
ALT FLD-CCNC: 11 U/L — SIGNIFICANT CHANGE UP (ref 4–33)
ANION GAP SERPL CALC-SCNC: 11 MMOL/L — SIGNIFICANT CHANGE UP (ref 7–14)
AST SERPL-CCNC: 10 U/L — SIGNIFICANT CHANGE UP (ref 4–32)
BASOPHILS # BLD AUTO: 0.03 K/UL — SIGNIFICANT CHANGE UP (ref 0–0.2)
BASOPHILS NFR BLD AUTO: 0.6 % — SIGNIFICANT CHANGE UP (ref 0–2)
BILIRUB SERPL-MCNC: 0.3 MG/DL — SIGNIFICANT CHANGE UP (ref 0.2–1.2)
BUN SERPL-MCNC: 10 MG/DL — SIGNIFICANT CHANGE UP (ref 7–23)
CALCIUM SERPL-MCNC: 8.6 MG/DL — SIGNIFICANT CHANGE UP (ref 8.4–10.5)
CHLORIDE SERPL-SCNC: 103 MMOL/L — SIGNIFICANT CHANGE UP (ref 98–107)
CK SERPL-CCNC: 97 U/L — SIGNIFICANT CHANGE UP (ref 25–170)
CO2 SERPL-SCNC: 21 MMOL/L — LOW (ref 22–31)
CREAT SERPL-MCNC: 0.53 MG/DL — SIGNIFICANT CHANGE UP (ref 0.5–1.3)
EGFR: 126 ML/MIN/1.73M2 — SIGNIFICANT CHANGE UP
EGFR: 126 ML/MIN/1.73M2 — SIGNIFICANT CHANGE UP
EOSINOPHIL # BLD AUTO: 0.09 K/UL — SIGNIFICANT CHANGE UP (ref 0–0.5)
EOSINOPHIL NFR BLD AUTO: 1.8 % — SIGNIFICANT CHANGE UP (ref 0–6)
GLUCOSE SERPL-MCNC: 100 MG/DL — HIGH (ref 70–99)
HCT VFR BLD CALC: 37.7 % — SIGNIFICANT CHANGE UP (ref 34.5–45)
HGB BLD-MCNC: 13.5 G/DL — SIGNIFICANT CHANGE UP (ref 11.5–15.5)
IANC: 2.56 K/UL — SIGNIFICANT CHANGE UP (ref 1.8–7.4)
IMM GRANULOCYTES NFR BLD AUTO: 0.6 % — SIGNIFICANT CHANGE UP (ref 0–0.9)
LYMPHOCYTES # BLD AUTO: 1.74 K/UL — SIGNIFICANT CHANGE UP (ref 1–3.3)
LYMPHOCYTES # BLD AUTO: 35.3 % — SIGNIFICANT CHANGE UP (ref 13–44)
MCHC RBC-ENTMCNC: 29.5 PG — SIGNIFICANT CHANGE UP (ref 27–34)
MCHC RBC-ENTMCNC: 35.8 G/DL — SIGNIFICANT CHANGE UP (ref 32–36)
MCV RBC AUTO: 82.3 FL — SIGNIFICANT CHANGE UP (ref 80–100)
MONOCYTES # BLD AUTO: 0.48 K/UL — SIGNIFICANT CHANGE UP (ref 0–0.9)
MONOCYTES NFR BLD AUTO: 9.7 % — SIGNIFICANT CHANGE UP (ref 2–14)
NEUTROPHILS # BLD AUTO: 2.56 K/UL — SIGNIFICANT CHANGE UP (ref 1.8–7.4)
NEUTROPHILS NFR BLD AUTO: 52 % — SIGNIFICANT CHANGE UP (ref 43–77)
NRBC # BLD AUTO: 0 K/UL — SIGNIFICANT CHANGE UP (ref 0–0)
NRBC # FLD: 0 K/UL — SIGNIFICANT CHANGE UP (ref 0–0)
NRBC BLD AUTO-RTO: 0 /100 WBCS — SIGNIFICANT CHANGE UP (ref 0–0)
PLATELET # BLD AUTO: 240 K/UL — SIGNIFICANT CHANGE UP (ref 150–400)
POTASSIUM SERPL-MCNC: 3.8 MMOL/L — SIGNIFICANT CHANGE UP (ref 3.5–5.3)
POTASSIUM SERPL-SCNC: 3.8 MMOL/L — SIGNIFICANT CHANGE UP (ref 3.5–5.3)
PROT SERPL-MCNC: 6.4 G/DL — SIGNIFICANT CHANGE UP (ref 6–8.3)
RBC # BLD: 4.58 M/UL — SIGNIFICANT CHANGE UP (ref 3.8–5.2)
RBC # FLD: 12.4 % — SIGNIFICANT CHANGE UP (ref 10.3–14.5)
SODIUM SERPL-SCNC: 135 MMOL/L — SIGNIFICANT CHANGE UP (ref 135–145)
WBC # BLD: 4.93 K/UL — SIGNIFICANT CHANGE UP (ref 3.8–10.5)
WBC # FLD AUTO: 4.93 K/UL — SIGNIFICANT CHANGE UP (ref 3.8–10.5)

## 2025-05-06 PROCEDURE — 99284 EMERGENCY DEPT VISIT MOD MDM: CPT | Mod: FS

## 2025-05-06 PROCEDURE — 93971 EXTREMITY STUDY: CPT | Mod: 26,LT

## 2025-05-06 PROCEDURE — 73502 X-RAY EXAM HIP UNI 2-3 VIEWS: CPT | Mod: 26,LT

## 2025-05-06 PROCEDURE — 73552 X-RAY EXAM OF FEMUR 2/>: CPT | Mod: 26,LT

## 2025-05-06 RX ORDER — ACETAMINOPHEN 500 MG/5ML
650 LIQUID (ML) ORAL ONCE
Refills: 0 | Status: COMPLETED | OUTPATIENT
Start: 2025-05-06 | End: 2025-05-06

## 2025-05-06 RX ADMIN — Medication 650 MILLIGRAM(S): at 11:26

## 2025-05-06 NOTE — ED PROVIDER NOTE - NSFOLLOWUPINSTRUCTIONS_ED_ALL_ED_FT
Follow-up with an orthopedic doctor within 1 week, referral list attached  Follow-up with your primary care doctor within 1 week, show copies of your results  Take Tylenol 650 mg every 6 hours as needed for pain  – You can also take ibuprofen 600 mg every 8 hours for pain, take with food  Return to the ER with any worsening or concerning symptoms increased pain, numbness, weakness, difficulty walking or any other concerns

## 2025-05-06 NOTE — ED PROVIDER NOTE - CLINICAL SUMMARY MEDICAL DECISION MAKING FREE TEXT BOX
34-year-old female with past medical history HTN, intellectual disability presenting to the ER with left thigh pain that began this morning.  Patient resides at [facility], [facility] staff member Edilma at bedside.  patient states that this morning her leg was "paining her", and she localizes pain to the thigh.  No known trauma or injury, hip pain, knee pain, leg swelling, numbness/tingling, weakness, difficulty walking, fever/chills, skin changes. On exam pt is well appearing, afebrile, normotensive, no LE swelling, no thigh swelling, left thigh non tender to palpation, no overlying skin changes, FROM of b/l LE, ambulatory in ED without gait disturbance. Concern for MSK type pain, no recent strenuous activity or exercise to suggest rhabdo, no DVT risk factors. Will xray to r/o underlying bony lesion. 34-year-old female with past medical history HTN, intellectual disability presenting to the ER with left thigh pain that began this morning.  Patient resides at [facility], [facility] staff member Edilma at bedside.  patient states that this morning her leg was "paining her", and she localizes pain to the thigh.  No known trauma or injury, hip pain, knee pain, leg swelling, numbness/tingling, weakness, difficulty walking, fever/chills, skin changes. On exam pt is well appearing, afebrile, normotensive, no LE swelling, no thigh swelling, left thigh non tender to palpation, no overlying skin changes, FROM of b/l LE, ambulatory in ED without gait disturbance. Concern for MSK type pain, no recent strenuous activity or exercise to suggest rhabdo, no DVT risk factors. Will xray to r/o underlying bony lesion, given hx of  intellectual disability will US to r/o DVT, check  cbc/cmp, ck, give tylenol for pain

## 2025-05-06 NOTE — ED PROVIDER NOTE - ATTENDING APP SHARED VISIT CONTRIBUTION OF CARE
GEN - NAD; well appearing; A+O x3   HEAD - NC/AT   EYES- PERRL, EOMI  ENT: Airway patent, mmm, Oral cavity and pharynx normal. No inflammation, swelling, exudate, or lesions.  NECK: Neck supple  PULMONARY - CTA b/l, symmetric breath sounds.   CARDIAC -s1s2, RRR, no M,G,R  ABDOMEN - +BS, ND, NT, soft, no guarding, no rebound, no masses   BACK - no CVA tenderness, Normal  spine   EXTREMITIES - FROM to joints of b/l ue and le, left thigh with no ttp to thigh, compartments soft, joints of hip/knee ankle and foot with from without pain. symmetric pulses, capillary refill < 2 seconds, no edema   SKIN - no rash or bruising   NEUROLOGIC - alert, speech clear, no focal deficits  PSYCH -nl mood/affect, nl insight. GEN - NAD; well appearing; A+O x3   HEAD - NC/AT   EYES- PERRL, EOMI  ENT: Airway patent, mmm, Oral cavity and pharynx normal. No inflammation, swelling, exudate, or lesions.  NECK: Neck supple  PULMONARY - CTA b/l, symmetric breath sounds.   CARDIAC -s1s2, RRR, no M,G,R  ABDOMEN - +BS, ND, NT, soft, no guarding, no rebound, no masses   BACK - no CVA tenderness, Normal  spine   EXTREMITIES - FROM to joints of b/l ue and le, left thigh with no ttp to thigh, compartments soft, joints of hip/knee ankle and foot with from without pain. symmetric pulses, capillary refill < 2 seconds, no edema   SKIN - no rash or bruising   NEUROLOGIC - alert, speech clear, no focal deficits  PSYCH -nl mood/affect, nl insight.  agree with above hpi by quintin boudreaux,   will check labs, xr hip/femur, duplex, appears very well, leg well perfused, no deformity or trauma.

## 2025-05-06 NOTE — ED PROVIDER NOTE - PHYSICAL EXAMINATION
no LE swelling, no thigh swelling, left thigh non tender to palpation, no overlying skin changes, FROM of b/l LE  distal pulses intact, sensation intact and equal b/l, strength 5/5 in all extremities

## 2025-05-06 NOTE — ED ADULT TRIAGE NOTE - CHIEF COMPLAINT QUOTE
pt with hx mild MR, c/o of lt thigh pain since this am, denies any trauma pt ambulatory pt with hx mild MR, c/o of lt thigh pain since this am, denies any trauma pt ambulatory, pt appears calm and cooperative @ present

## 2025-05-06 NOTE — ED ADULT NURSE NOTE - NSFALLHARMRISKINTERV_ED_ALL_ED

## 2025-05-06 NOTE — ED PROVIDER NOTE - PROGRESS NOTE DETAILS
JESSIKA Lund: Lab work without acute abnormality, CK within normal, ultrasound does not show evidence of DVT, x-ray left hip/femur with no acute fracture, does show findings that can be seen in femoral acetabular impingement.  Recommend outpatient orthopedic follow-up.  Aide at bedside helping to facilitate transportation back to [facility] home.  Patient will return to the ER with any worsening or concerning symptoms JESSIKA Lund: Lab work without acute abnormality, CK within normal, ultrasound does not show evidence of DVT, x-ray left hip/femur with no acute fracture, does show findings that can be seen in femoral acetabular impingement.  Recommend outpatient orthopedic follow-up.  Aide at bedside helping to facilitate transportation back to group home, they have a van that they use.  Patient will return to the ER with any worsening or concerning symptoms

## 2025-05-06 NOTE — ED PROVIDER NOTE - PATIENT PORTAL LINK FT
You can access the FollowMyHealth Patient Portal offered by [facility] by registering at the following website: [email or web address]. By joining "[employer]’s FollowMyHealth portal, you will also be able to view your health information using other applications (apps) compatible with our system.

## 2025-05-06 NOTE — ED PROVIDER NOTE - OBJECTIVE STATEMENT
34-year-old female with past medical history HTN, intellectual disability presenting to the ER with left thigh pain that began this morning.  Patient resides at [facility], [facility] staff member Edilma at bedside.  patient states that this morning her leg was "paining her", and she localizes pain to the thigh.  Patient denies known trauma or injury, hip pain, knee pain, leg swelling, numbness/tingling, weakness, difficulty walking, fever/chills, skin changes, OCP use, CP, SOB, abdominal pain, N/V/D or any other concerns

## 2025-05-06 NOTE — ED ADULT NURSE NOTE - CHIEF COMPLAINT QUOTE
pt with hx mild MR, c/o of lt thigh pain since this am, denies any trauma pt ambulatory, pt appears calm and cooperative @ present

## 2025-05-06 NOTE — ED ADULT NURSE NOTE - OBJECTIVE STATEMENT
pt received to room 24. pt accompanied by group home staff member, ann-marie [id number] and had trouble answering questions initially, but answered correctly with help from group home staff. pt has a hx of mr and staff member stated pt is at baseline. pt stated she has had non radiating left thigh pain that started this morning, and described it as hurting a lot, pt denied falling or pain anywhere else. vital signs stable. pt breathing with equal and bilateral chest rise, pt denied abdominal pain, abdomen soft, nontender, nondistended. clear to auscultation bilaterally. no other abnormalities noted on assessment. side rails up and pt educated on call bell use. pt denies chest pain

## 2025-05-08 PROBLEM — Z00.00 ENCOUNTER FOR PREVENTIVE HEALTH EXAMINATION: Status: ACTIVE | Noted: 2025-05-08

## 2025-06-10 ENCOUNTER — APPOINTMENT (OUTPATIENT)
Dept: OPHTHALMOLOGY | Facility: CLINIC | Age: 35
End: 2025-06-10
Payer: MEDICARE

## 2025-06-10 ENCOUNTER — NON-APPOINTMENT (OUTPATIENT)
Age: 35
End: 2025-06-10

## 2025-06-10 PROCEDURE — 92004 COMPRE OPH EXAM NEW PT 1/>: CPT

## 2025-08-08 ENCOUNTER — EMERGENCY (EMERGENCY)
Facility: HOSPITAL | Age: 35
LOS: 1 days | End: 2025-08-08
Attending: EMERGENCY MEDICINE | Admitting: EMERGENCY MEDICINE
Payer: MEDICARE

## 2025-08-08 VITALS
OXYGEN SATURATION: 99 % | RESPIRATION RATE: 16 BRPM | HEART RATE: 63 BPM | TEMPERATURE: 98 F | SYSTOLIC BLOOD PRESSURE: 146 MMHG | DIASTOLIC BLOOD PRESSURE: 107 MMHG

## 2025-08-08 VITALS
OXYGEN SATURATION: 99 % | RESPIRATION RATE: 16 BRPM | HEART RATE: 58 BPM | DIASTOLIC BLOOD PRESSURE: 80 MMHG | HEIGHT: 63 IN | SYSTOLIC BLOOD PRESSURE: 138 MMHG | TEMPERATURE: 98 F

## 2025-08-08 PROCEDURE — 99284 EMERGENCY DEPT VISIT MOD MDM: CPT | Mod: GC

## 2025-08-08 PROCEDURE — 93010 ELECTROCARDIOGRAM REPORT: CPT

## 2025-08-10 ENCOUNTER — EMERGENCY (EMERGENCY)
Facility: HOSPITAL | Age: 35
LOS: 1 days | End: 2025-08-10
Admitting: EMERGENCY MEDICINE
Payer: MEDICARE

## 2025-08-10 VITALS
OXYGEN SATURATION: 100 % | DIASTOLIC BLOOD PRESSURE: 84 MMHG | RESPIRATION RATE: 15 BRPM | TEMPERATURE: 98 F | HEART RATE: 78 BPM | SYSTOLIC BLOOD PRESSURE: 131 MMHG | HEIGHT: 63 IN

## 2025-08-10 PROCEDURE — 99285 EMERGENCY DEPT VISIT HI MDM: CPT

## 2025-08-11 VITALS
DIASTOLIC BLOOD PRESSURE: 97 MMHG | HEART RATE: 73 BPM | TEMPERATURE: 99 F | OXYGEN SATURATION: 97 % | SYSTOLIC BLOOD PRESSURE: 144 MMHG | RESPIRATION RATE: 18 BRPM

## 2025-08-11 LAB
ALBUMIN SERPL ELPH-MCNC: 4.2 G/DL — SIGNIFICANT CHANGE UP (ref 3.3–5)
ALP SERPL-CCNC: 56 U/L — SIGNIFICANT CHANGE UP (ref 40–120)
ALT FLD-CCNC: 18 U/L — SIGNIFICANT CHANGE UP (ref 4–33)
ANION GAP SERPL CALC-SCNC: 14 MMOL/L — SIGNIFICANT CHANGE UP (ref 7–14)
AST SERPL-CCNC: 13 U/L — SIGNIFICANT CHANGE UP (ref 4–32)
BASOPHILS # BLD AUTO: 0.05 K/UL — SIGNIFICANT CHANGE UP (ref 0–0.2)
BASOPHILS NFR BLD AUTO: 1 % — SIGNIFICANT CHANGE UP (ref 0–2)
BILIRUB SERPL-MCNC: 0.4 MG/DL — SIGNIFICANT CHANGE UP (ref 0.2–1.2)
BUN SERPL-MCNC: 8 MG/DL — SIGNIFICANT CHANGE UP (ref 7–23)
CALCIUM SERPL-MCNC: 8.9 MG/DL — SIGNIFICANT CHANGE UP (ref 8.4–10.5)
CHLORIDE SERPL-SCNC: 104 MMOL/L — SIGNIFICANT CHANGE UP (ref 98–107)
CO2 SERPL-SCNC: 20 MMOL/L — LOW (ref 22–31)
CREAT SERPL-MCNC: 0.51 MG/DL — SIGNIFICANT CHANGE UP (ref 0.5–1.3)
EGFR: 127 ML/MIN/1.73M2 — SIGNIFICANT CHANGE UP
EGFR: 127 ML/MIN/1.73M2 — SIGNIFICANT CHANGE UP
EOSINOPHIL # BLD AUTO: 0.1 K/UL — SIGNIFICANT CHANGE UP (ref 0–0.5)
EOSINOPHIL NFR BLD AUTO: 2 % — SIGNIFICANT CHANGE UP (ref 0–6)
GLUCOSE SERPL-MCNC: 112 MG/DL — HIGH (ref 70–99)
HCT VFR BLD CALC: 38.3 % — SIGNIFICANT CHANGE UP (ref 34.5–45)
HGB BLD-MCNC: 13.9 G/DL — SIGNIFICANT CHANGE UP (ref 11.5–15.5)
IMM GRANULOCYTES # BLD AUTO: 0.01 K/UL — SIGNIFICANT CHANGE UP (ref 0–0.07)
IMM GRANULOCYTES NFR BLD AUTO: 0.2 % — SIGNIFICANT CHANGE UP (ref 0–0.9)
LYMPHOCYTES # BLD AUTO: 1.86 K/UL — SIGNIFICANT CHANGE UP (ref 1–3.3)
LYMPHOCYTES NFR BLD AUTO: 36.6 % — SIGNIFICANT CHANGE UP (ref 13–44)
MCHC RBC-ENTMCNC: 31 PG — SIGNIFICANT CHANGE UP (ref 27–34)
MCHC RBC-ENTMCNC: 36.3 G/DL — HIGH (ref 32–36)
MCV RBC AUTO: 85.3 FL — SIGNIFICANT CHANGE UP (ref 80–100)
MONOCYTES # BLD AUTO: 0.39 K/UL — SIGNIFICANT CHANGE UP (ref 0–0.9)
MONOCYTES NFR BLD AUTO: 7.7 % — SIGNIFICANT CHANGE UP (ref 2–14)
NEUTROPHILS # BLD AUTO: 2.67 K/UL — SIGNIFICANT CHANGE UP (ref 1.8–7.4)
NEUTROPHILS NFR BLD AUTO: 52.5 % — SIGNIFICANT CHANGE UP (ref 43–77)
NRBC # BLD AUTO: 0 K/UL — SIGNIFICANT CHANGE UP (ref 0–0)
NRBC # FLD: 0 K/UL — SIGNIFICANT CHANGE UP (ref 0–0)
NRBC BLD AUTO-RTO: 0 /100 WBCS — SIGNIFICANT CHANGE UP (ref 0–0)
PLATELET # BLD AUTO: 267 K/UL — SIGNIFICANT CHANGE UP (ref 150–400)
PMV BLD: 10.5 FL — SIGNIFICANT CHANGE UP (ref 7–13)
POTASSIUM SERPL-MCNC: 3.7 MMOL/L — SIGNIFICANT CHANGE UP (ref 3.5–5.3)
POTASSIUM SERPL-SCNC: 3.7 MMOL/L — SIGNIFICANT CHANGE UP (ref 3.5–5.3)
PROT SERPL-MCNC: 7.2 G/DL — SIGNIFICANT CHANGE UP (ref 6–8.3)
RBC # BLD: 4.49 M/UL — SIGNIFICANT CHANGE UP (ref 3.8–5.2)
RBC # FLD: 12.2 % — SIGNIFICANT CHANGE UP (ref 10.3–14.5)
SODIUM SERPL-SCNC: 138 MMOL/L — SIGNIFICANT CHANGE UP (ref 135–145)
TROPONIN T, HIGH SENSITIVITY RESULT: <6 NG/L — SIGNIFICANT CHANGE UP
WBC # BLD: 5.08 K/UL — SIGNIFICANT CHANGE UP (ref 3.8–10.5)
WBC # FLD AUTO: 5.08 K/UL — SIGNIFICANT CHANGE UP (ref 3.8–10.5)

## 2025-08-11 PROCEDURE — 93010 ELECTROCARDIOGRAM REPORT: CPT

## 2025-08-11 RX ORDER — SUCRALFATE 1 G
1 TABLET ORAL ONCE
Refills: 0 | Status: COMPLETED | OUTPATIENT
Start: 2025-08-11 | End: 2025-08-11

## 2025-08-11 RX ORDER — MAGNESIUM, ALUMINUM HYDROXIDE 200-200 MG
30 TABLET,CHEWABLE ORAL ONCE
Refills: 0 | Status: COMPLETED | OUTPATIENT
Start: 2025-08-11 | End: 2025-08-11

## 2025-08-11 RX ADMIN — Medication 20 MILLIGRAM(S): at 00:54

## 2025-08-11 RX ADMIN — Medication 1 GRAM(S): at 02:01

## 2025-08-11 RX ADMIN — Medication 30 MILLILITER(S): at 00:54
